# Patient Record
Sex: MALE | Race: BLACK OR AFRICAN AMERICAN | Employment: FULL TIME | URBAN - METROPOLITAN AREA
[De-identification: names, ages, dates, MRNs, and addresses within clinical notes are randomized per-mention and may not be internally consistent; named-entity substitution may affect disease eponyms.]

---

## 2024-10-11 ENCOUNTER — HOSPITAL ENCOUNTER (EMERGENCY)
Age: 44
Discharge: HOME | End: 2024-10-11
Payer: COMMERCIAL

## 2024-10-11 VITALS
TEMPERATURE: 98.2 F | DIASTOLIC BLOOD PRESSURE: 90 MMHG | RESPIRATION RATE: 16 BRPM | HEART RATE: 73 BPM | OXYGEN SATURATION: 100 % | SYSTOLIC BLOOD PRESSURE: 138 MMHG

## 2024-10-11 DIAGNOSIS — Z23 NEED FOR TETANUS BOOSTER: Primary | ICD-10-CM

## 2024-10-11 DIAGNOSIS — S61.411A LACERATION OF RIGHT HAND WITHOUT FOREIGN BODY, INITIAL ENCOUNTER: ICD-10-CM

## 2024-10-11 PROCEDURE — 90715 TDAP VACCINE 7 YRS/> IM: CPT | Performed by: PHYSICIAN ASSISTANT

## 2024-10-11 PROCEDURE — 99284 EMERGENCY DEPT VISIT MOD MDM: CPT

## 2024-10-11 PROCEDURE — 90471 IMMUNIZATION ADMIN: CPT | Performed by: PHYSICIAN ASSISTANT

## 2024-10-11 PROCEDURE — 6360000002 HC RX W HCPCS: Performed by: PHYSICIAN ASSISTANT

## 2024-10-11 PROCEDURE — 12002 RPR S/N/AX/GEN/TRNK2.6-7.5CM: CPT

## 2024-10-11 RX ADMIN — TETANUS TOXOID, REDUCED DIPHTHERIA TOXOID AND ACELLULAR PERTUSSIS VACCINE, ADSORBED 0.5 ML: 5; 2.5; 8; 8; 2.5 SUSPENSION INTRAMUSCULAR at 11:23

## 2024-10-11 ASSESSMENT — PAIN - FUNCTIONAL ASSESSMENT: PAIN_FUNCTIONAL_ASSESSMENT: NONE - DENIES PAIN

## 2024-10-11 ASSESSMENT — ENCOUNTER SYMPTOMS: COLOR CHANGE: 0

## 2024-10-11 NOTE — ED PROVIDER NOTES
BP: (!) 138/90, Temp: 98.2 °F (36.8 °C), Pulse: 73, Respirations: 16, SpO2: 100 %  Physical Exam  Vitals and nursing note reviewed.   Constitutional:       Appearance: Normal appearance.   HENT:      Head: Normocephalic and atraumatic.   Cardiovascular:      Rate and Rhythm: Normal rate.      Pulses: Normal pulses.   Pulmonary:      Effort: Pulmonary effort is normal.   Musculoskeletal:         General: No tenderness. Normal range of motion.      Comments: Patient has 2 superficial lacerations/abrasions to the palmar surface of the right hand 1 measuring 1.5 cm the other measuring about 2 cm.  There is no bleeding noted.  No foreign body noted.  No tendon involvement noted.  Full range of motion of right hand and digits.  2+ radial pulse right upper extremity.   Skin:     General: Skin is warm and dry.   Neurological:      General: No focal deficit present.      Mental Status: He is alert and oriented to person, place, and time.      Sensory: No sensory deficit.      Motor: No weakness.      Comments: Medial, radial, ulnar nerves intact right upper extremity.            Phuong Prince PA  10/11/24 1106

## 2024-10-11 NOTE — ED PROVIDER NOTES
THE [facility]  EMERGENCY DEPARTMENT ENCOUNTER          PHYSICIAN ASSISTANT NOTE       Date of evaluation: 10/11/2024    Chief Complaint     Laceration (Scratched right hand on je nail. Needs tetanus )      History of Present Illness     Matt Park is a 44 y.o. male who presents with superficial lacerations to the right hand.  Patient states he was at work and a je nail went through his glove causing superficial lacerations to the palmar surface of the right hand.  Patient states he does not need sutures but does need a tetanus shot.  He denies any decreased range of motion, radiation pain, paresthesia or weakness of the right upper extremity.  Last tetanus shot is unknown.  Denies any bleeding from wounds.    ASSESSMENT / PLAN  (MEDICAL DECISION MAKING)     INITIAL VITALS: BP: (!) 138/90, Temp: 98.2 °F (36.8 °C), Pulse: 73, Respirations: 16, SpO2: 100 %    Matt Park is a 44 y.o. male presents here with superficial lacerations to his right hand.  Patient states a je nail went through his glove causing the superficial laceration/abrasions.  Patient*vascular intact.  He states he does not need wound repair but does need an updated tetanus shot as he is unsure of last tetanus.  He is neurovascular intact of right upper extremity with forage motion of right upper extremity.  No signs of foreign body or tendon involvement.  Tetanus shot was updated today.  Patient declines wound cleaning and states he is already cleaned the wound.  He also declined any Polysporin and dressing on the wound.  He can take Tylenol ibuprofen as needed for pain.  Wash wound daily and use antibiotic ointment and change dressings daily.  If increased pain redness swelling drainage or worsening symptoms he can return to emergency department.  Patient stable for discharge.    This patient was seen independently.    Is this patient to be included in the SEP-1 core measure? No Exclusion criteria - the patient is NOT to be  included for SEP-1 Core Measure due to: Infection is not suspected    Medical Decision Making  Risk  Prescription drug management.        This patient was seen independently.    Clinical Impression     1. Need for tetanus booster    2. Laceration of right hand without foreign body, initial encounter        Disposition     PATIENT REFERRED TO:  [facility] Emergency Department  [address] [facility] [address]  [phone]    If symptoms worsen      DISCHARGE MEDICATIONS:  New Prescriptions    No medications on file       DISPOSITION Discharge - Pending Orders Complete 10/11/2024 11:04:05 AM  Condition at Disposition: Data Unavailable        Diagnostic Results and Other Data       RECENT VITALS:  BP: (!) 138/90, Temp: 98.2 °F (36.8 °C), Pulse: 73, Respirations: 16, SpO2: 100 %     Procedures       ED Course     Nursing Notes, Past Medical Hx,Past Surgical Hx, Social Hx, Allergies, and Family Hx were reviewed.         The patient was given the following medications:  Orders Placed This Encounter   Medications    Tetanus-Diphth-Acell Pertussis (BOOSTRIX) injection 0.5 mL       CONSULTS:  None    Review of Systems     Review of Systems   Constitutional:  Negative for chills and fever.   Musculoskeletal:  Negative for joint swelling.   Skin:  Positive for wound. Negative for color change.   Neurological:  Negative for weakness and numbness.   All other systems reviewed and are negative.      Past Medical, Surgical, Family, and Social History     He has no past medical history on file.  He has no past surgical history on file.  His family history is not on file.  He reports that he has been smoking cigarettes. He does not have any smokeless tobacco history on file. He reports that he does not currently use alcohol. He reports that he does not use drugs.    Medications     Previous Medications    No medications on file       Allergies     He has No Known Allergies.    Physical Exam     INITIAL VITALS:

## 2024-10-11 NOTE — DISCHARGE INSTRUCTIONS
Wash wound with soap and water daily.  Use antibiotic ointment and change dressings daily.  May take Tylenol ibuprofen for pain.  If increased pain, redness, swelling, drainage, bleeding or worse return to emergency department.  Your tetanus shot was updated today and good for 10 years

## 2024-10-11 NOTE — ED NOTES
Pt verbalizes understanding of discharge instructions. Pt discharged with copy of Memo Romero RN  10/11/24 2943